# Patient Record
Sex: MALE | Race: WHITE | HISPANIC OR LATINO
[De-identification: names, ages, dates, MRNs, and addresses within clinical notes are randomized per-mention and may not be internally consistent; named-entity substitution may affect disease eponyms.]

---

## 2019-07-26 ENCOUNTER — APPOINTMENT (OUTPATIENT)
Dept: INTERNAL MEDICINE | Facility: CLINIC | Age: 74
End: 2019-07-26
Payer: MEDICARE

## 2019-07-26 ENCOUNTER — NON-APPOINTMENT (OUTPATIENT)
Age: 74
End: 2019-07-26

## 2019-07-26 VITALS
BODY MASS INDEX: 25.48 KG/M2 | TEMPERATURE: 98 F | HEART RATE: 91 BPM | DIASTOLIC BLOOD PRESSURE: 90 MMHG | OXYGEN SATURATION: 95 % | HEIGHT: 71 IN | SYSTOLIC BLOOD PRESSURE: 176 MMHG | WEIGHT: 182 LBS

## 2019-07-26 VITALS — DIASTOLIC BLOOD PRESSURE: 98 MMHG | SYSTOLIC BLOOD PRESSURE: 175 MMHG

## 2019-07-26 DIAGNOSIS — Z84.1 FAMILY HISTORY OF DISORDERS OF KIDNEY AND URETER: ICD-10-CM

## 2019-07-26 DIAGNOSIS — J30.2 OTHER SEASONAL ALLERGIC RHINITIS: ICD-10-CM

## 2019-07-26 DIAGNOSIS — Z78.9 OTHER SPECIFIED HEALTH STATUS: ICD-10-CM

## 2019-07-26 DIAGNOSIS — Z82.49 FAMILY HISTORY OF ISCHEMIC HEART DISEASE AND OTHER DISEASES OF THE CIRCULATORY SYSTEM: ICD-10-CM

## 2019-07-26 DIAGNOSIS — R94.31 ABNORMAL ELECTROCARDIOGRAM [ECG] [EKG]: ICD-10-CM

## 2019-07-26 DIAGNOSIS — Z87.81 PERSONAL HISTORY OF (HEALED) TRAUMATIC FRACTURE: ICD-10-CM

## 2019-07-26 DIAGNOSIS — Z87.891 PERSONAL HISTORY OF NICOTINE DEPENDENCE: ICD-10-CM

## 2019-07-26 PROCEDURE — G0444 DEPRESSION SCREEN ANNUAL: CPT

## 2019-07-26 PROCEDURE — 99213 OFFICE O/P EST LOW 20 MIN: CPT | Mod: 25

## 2019-07-26 PROCEDURE — 36415 COLL VENOUS BLD VENIPUNCTURE: CPT

## 2019-07-26 PROCEDURE — G0439: CPT

## 2019-07-26 PROCEDURE — 93000 ELECTROCARDIOGRAM COMPLETE: CPT

## 2019-07-26 NOTE — PLAN
[FreeTextEntry1] : Healthcare Maintenance\par -routine labs, follow up results\par -never had colonoscopy, discussed having one time screening vs stool FIT test but he declines both at this time\par -EKG reviewed\par -depression screen negative\par -to obtain prior records from [place] including EKG and CT chest and have sent to office\par \par HTN\par -above goal today, appears to have had HTN in past as well\par -states EKG was normal in ER last month, EKG in office today showing RBBB, cardiology referral provided\par -start amlodipine 5mg daily\par -return next week for follow up visit to monitor BP\par

## 2019-07-26 NOTE — HEALTH RISK ASSESSMENT
[No] : In the past 12 months have you used drugs other than those required for medical reasons? No [No falls in past year] : Patient reported no falls in the past year [0] : 2) Feeling down, depressed, or hopeless: Not at all (0) [None] : None [Alone] : lives alone [] :  [Retired] : retired [Feels Safe at Home] : Feels safe at home [# Of Children ___] : has [unfilled] children [Fully functional (bathing, dressing, toileting, transferring, walking, feeding)] : Fully functional (bathing, dressing, toileting, transferring, walking, feeding) [Fully functional (using the telephone, shopping, preparing meals, housekeeping, doing laundry, using] : Fully functional and needs no help or supervision to perform IADLs (using the telephone, shopping, preparing meals, housekeeping, doing laundry, using transportation, managing medications and managing finances) [Carbon Monoxide Detector] : carbon monoxide detector [Smoke Detector] : smoke detector [Seat Belt] :  uses seat belt [] : No [de-identified] : 1ppd for >40 years [CXQ1Oxnwr] : 0 [YearQuit] : 2019 [Patient declined colonoscopy] : Patient declined colonoscopy [Change in mental status noted] : No change in mental status noted [Language] : denies difficulty with language [Reports changes in hearing] : Reports no changes in hearing [Reports changes in vision] : Reports no changes in vision [Reports changes in dental health] : Reports no changes in dental health [ColonoscopyComments] : never had  [FreeTextEntry2] : semi-retired
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
## 2019-07-26 NOTE — PHYSICAL EXAM
[No Acute Distress] : no acute distress [Well Nourished] : well nourished [Well Developed] : well developed [Well-Appearing] : well-appearing [Normal Sclera/Conjunctiva] : normal sclera/conjunctiva [PERRL] : pupils equal round and reactive to light [Normal Outer Ear/Nose] : the outer ears and nose were normal in appearance [Normal Oropharynx] : the oropharynx was normal [Supple] : supple [No Respiratory Distress] : no respiratory distress  [No Accessory Muscle Use] : no accessory muscle use [Clear to Auscultation] : lungs were clear to auscultation bilaterally [Normal Rate] : normal rate  [Normal S1, S2] : normal S1 and S2 [Regular Rhythm] : with a regular rhythm [Pedal Pulses Present] : the pedal pulses are present [No Extremity Clubbing/Cyanosis] : no extremity clubbing/cyanosis [No Edema] : there was no peripheral edema [Soft] : abdomen soft [Non Tender] : non-tender [Non-distended] : non-distended [Normal Bowel Sounds] : normal bowel sounds [Normal Anterior Cervical Nodes] : no anterior cervical lymphadenopathy [No CVA Tenderness] : no CVA  tenderness [Grossly Normal Strength/Tone] : grossly normal strength/tone [Coordination Grossly Intact] : coordination grossly intact [No Focal Deficits] : no focal deficits [Normal Gait] : normal gait [Normal Affect] : the affect was normal [Alert and Oriented x3] : oriented to person, place, and time [Normal Insight/Judgement] : insight and judgment were intact [de-identified] : venous varicosities present bilateral ankles

## 2019-07-29 LAB
25(OH)D3 SERPL-MCNC: 37.2 NG/ML
ALBUMIN SERPL ELPH-MCNC: 4.3 G/DL
ALP BLD-CCNC: 119 U/L
ALT SERPL-CCNC: 21 U/L
ANION GAP SERPL CALC-SCNC: 12 MMOL/L
AST SERPL-CCNC: 19 U/L
BASOPHILS # BLD AUTO: 0.05 K/UL
BASOPHILS NFR BLD AUTO: 0.6 %
BILIRUB SERPL-MCNC: 0.3 MG/DL
BUN SERPL-MCNC: 20 MG/DL
CALCIUM SERPL-MCNC: 9 MG/DL
CHLORIDE SERPL-SCNC: 105 MMOL/L
CHOLEST SERPL-MCNC: 220 MG/DL
CHOLEST/HDLC SERPL: 4.3 RATIO
CO2 SERPL-SCNC: 22 MMOL/L
CREAT SERPL-MCNC: 1.07 MG/DL
EOSINOPHIL # BLD AUTO: 0.18 K/UL
EOSINOPHIL NFR BLD AUTO: 2.1 %
ESTIMATED AVERAGE GLUCOSE: 117 MG/DL
GLUCOSE SERPL-MCNC: 103 MG/DL
HBA1C MFR BLD HPLC: 5.7 %
HCT VFR BLD CALC: 47.5 %
HDLC SERPL-MCNC: 51 MG/DL
HGB BLD-MCNC: 15.8 G/DL
IMM GRANULOCYTES NFR BLD AUTO: 0.2 %
LDLC SERPL CALC-MCNC: 132 MG/DL
LYMPHOCYTES # BLD AUTO: 1.68 K/UL
LYMPHOCYTES NFR BLD AUTO: 19.7 %
MAN DIFF?: NORMAL
MCHC RBC-ENTMCNC: 31.9 PG
MCHC RBC-ENTMCNC: 33.3 GM/DL
MCV RBC AUTO: 95.8 FL
MONOCYTES # BLD AUTO: 0.7 K/UL
MONOCYTES NFR BLD AUTO: 8.2 %
NEUTROPHILS # BLD AUTO: 5.91 K/UL
NEUTROPHILS NFR BLD AUTO: 69.2 %
PLATELET # BLD AUTO: 275 K/UL
POTASSIUM SERPL-SCNC: 4.5 MMOL/L
PROT SERPL-MCNC: 6.8 G/DL
RBC # BLD: 4.96 M/UL
RBC # FLD: 13.3 %
SODIUM SERPL-SCNC: 139 MMOL/L
TRIGL SERPL-MCNC: 187 MG/DL
TSH SERPL-ACNC: 0.62 UIU/ML
WBC # FLD AUTO: 8.54 K/UL

## 2019-08-02 ENCOUNTER — APPOINTMENT (OUTPATIENT)
Dept: INTERNAL MEDICINE | Facility: CLINIC | Age: 74
End: 2019-08-02
Payer: MEDICARE

## 2019-08-02 VITALS
OXYGEN SATURATION: 97 % | BODY MASS INDEX: 25.2 KG/M2 | DIASTOLIC BLOOD PRESSURE: 98 MMHG | WEIGHT: 180 LBS | HEIGHT: 71 IN | SYSTOLIC BLOOD PRESSURE: 170 MMHG | HEART RATE: 84 BPM

## 2019-08-02 VITALS — SYSTOLIC BLOOD PRESSURE: 165 MMHG | DIASTOLIC BLOOD PRESSURE: 95 MMHG

## 2019-08-02 PROCEDURE — 99213 OFFICE O/P EST LOW 20 MIN: CPT

## 2019-08-02 RX ORDER — PHENYLEPHRINE HCL 10 MG
7 TABLET ORAL
Qty: 10 | Refills: 0 | Status: DISCONTINUED | COMMUNITY
Start: 2019-06-26 | End: 2019-08-02

## 2019-08-02 NOTE — PHYSICAL EXAM
[Well Nourished] : well nourished [No Acute Distress] : no acute distress [Well Developed] : well developed [Well-Appearing] : well-appearing [No Accessory Muscle Use] : no accessory muscle use [No Respiratory Distress] : no respiratory distress  [Normal Rate] : normal rate  [Clear to Auscultation] : lungs were clear to auscultation bilaterally [Regular Rhythm] : with a regular rhythm [Normal S1, S2] : normal S1 and S2 [No Focal Deficits] : no focal deficits [Alert and Oriented x3] : oriented to person, place, and time

## 2019-08-02 NOTE — PLAN
[FreeTextEntry1] : HTN, HLD\par -continue amlodipine 5mg, add HCTZ 25mg daily\par -has cardiology appointment with Dr Taylor scheduled for 8/12\par -continue simvastatin\par -follow up in 2-3 months for repeat labs

## 2019-08-02 NOTE — HISTORY OF PRESENT ILLNESS
[FreeTextEntry1] : follow up visit [de-identified] : Patient presents to follow up for his HTN; he was started on amlodipine last week. He was also started on simvastatin earlier this week for elevated cholesterol on blood work. He has been compliant with medications; deneis any acute complaints no HA, dizziness, CP, SOB.

## 2019-08-26 ENCOUNTER — MEDICATION RENEWAL (OUTPATIENT)
Age: 74
End: 2019-08-26

## 2019-09-03 ENCOUNTER — APPOINTMENT (OUTPATIENT)
Dept: INTERNAL MEDICINE | Facility: CLINIC | Age: 74
End: 2019-09-03
Payer: MEDICARE

## 2019-09-03 VITALS
WEIGHT: 189 LBS | BODY MASS INDEX: 26.46 KG/M2 | DIASTOLIC BLOOD PRESSURE: 62 MMHG | SYSTOLIC BLOOD PRESSURE: 120 MMHG | HEART RATE: 93 BPM | TEMPERATURE: 98.2 F | HEIGHT: 71 IN | OXYGEN SATURATION: 96 %

## 2019-09-03 PROCEDURE — 99213 OFFICE O/P EST LOW 20 MIN: CPT

## 2019-09-03 NOTE — PLAN
[FreeTextEntry1] : HTN\par -continue current medications- unsure of other two medications which were added but instructed him to call office later today to let me know\par -will consider decreasing dose of one medication given fatigue\par -follow up with cardiology later this month scheduled\par -follow up in 3 months here for routine visit or sooner as needed

## 2019-09-03 NOTE — PHYSICAL EXAM
[No Acute Distress] : no acute distress [Well Nourished] : well nourished [Well Developed] : well developed [No Respiratory Distress] : no respiratory distress  [No Accessory Muscle Use] : no accessory muscle use [Clear to Auscultation] : lungs were clear to auscultation bilaterally [Normal Rate] : normal rate  [Regular Rhythm] : with a regular rhythm [No Edema] : there was no peripheral edema [Normal S1, S2] : normal S1 and S2 [No Focal Deficits] : no focal deficits [Alert and Oriented x3] : oriented to person, place, and time [de-identified] : bilateral venous varicosities present

## 2019-09-03 NOTE — HISTORY OF PRESENT ILLNESS
[FreeTextEntry1] : follow up [de-identified] : Patient presents for routine follow up. He was seen by cardiologist Dr Taylor twice since last visit and started on two additional BP medications. He notes feeling more tired than usual over the past few weeks but otherwise feels well. He mentioned feeling tired to cardiology and told it is likely due to medications. He cannot recall the names of those meds that were added. \par

## 2019-09-23 ENCOUNTER — RX RENEWAL (OUTPATIENT)
Age: 74
End: 2019-09-23

## 2019-09-26 ENCOUNTER — MEDICATION RENEWAL (OUTPATIENT)
Age: 74
End: 2019-09-26

## 2019-10-21 ENCOUNTER — RX RENEWAL (OUTPATIENT)
Age: 74
End: 2019-10-21

## 2019-10-25 ENCOUNTER — MOBILE ON CALL (OUTPATIENT)
Age: 74
End: 2019-10-25

## 2019-12-03 ENCOUNTER — APPOINTMENT (OUTPATIENT)
Dept: INTERNAL MEDICINE | Facility: CLINIC | Age: 74
End: 2019-12-03
Payer: MEDICARE

## 2019-12-03 VITALS
BODY MASS INDEX: 26.32 KG/M2 | SYSTOLIC BLOOD PRESSURE: 110 MMHG | OXYGEN SATURATION: 96 % | HEIGHT: 71 IN | DIASTOLIC BLOOD PRESSURE: 80 MMHG | HEART RATE: 76 BPM | WEIGHT: 188 LBS | TEMPERATURE: 97.9 F

## 2019-12-03 LAB
ALBUMIN SERPL ELPH-MCNC: 4.4 G/DL
ALP BLD-CCNC: 60 U/L
ALT SERPL-CCNC: 20 U/L
ANION GAP SERPL CALC-SCNC: 14 MMOL/L
AST SERPL-CCNC: 18 U/L
BILIRUB SERPL-MCNC: 0.4 MG/DL
BUN SERPL-MCNC: 26 MG/DL
CALCIUM SERPL-MCNC: 9.4 MG/DL
CHLORIDE SERPL-SCNC: 104 MMOL/L
CHOLEST SERPL-MCNC: 203 MG/DL
CHOLEST/HDLC SERPL: 3.8 RATIO
CO2 SERPL-SCNC: 24 MMOL/L
CREAT SERPL-MCNC: 1.34 MG/DL
GLUCOSE SERPL-MCNC: 124 MG/DL
HDLC SERPL-MCNC: 54 MG/DL
LDLC SERPL CALC-MCNC: 107 MG/DL
POTASSIUM SERPL-SCNC: 4.2 MMOL/L
PROT SERPL-MCNC: 7 G/DL
SODIUM SERPL-SCNC: 142 MMOL/L
TRIGL SERPL-MCNC: 208 MG/DL

## 2019-12-03 PROCEDURE — 99214 OFFICE O/P EST MOD 30 MIN: CPT | Mod: 25

## 2019-12-03 PROCEDURE — 36415 COLL VENOUS BLD VENIPUNCTURE: CPT

## 2019-12-03 RX ORDER — METOPROLOL SUCCINATE 25 MG/1
25 TABLET, EXTENDED RELEASE ORAL
Refills: 0 | Status: ACTIVE | COMMUNITY

## 2019-12-03 RX ORDER — HYDROCHLOROTHIAZIDE 25 MG/1
25 TABLET ORAL DAILY
Qty: 90 | Refills: 0 | Status: DISCONTINUED | COMMUNITY
Start: 2019-08-02 | End: 2019-12-03

## 2019-12-03 NOTE — PHYSICAL EXAM
[No Acute Distress] : no acute distress [Well Developed] : well developed [Well Nourished] : well nourished [No Respiratory Distress] : no respiratory distress  [Clear to Auscultation] : lungs were clear to auscultation bilaterally [No Accessory Muscle Use] : no accessory muscle use [Normal Rate] : normal rate  [Regular Rhythm] : with a regular rhythm [No Focal Deficits] : no focal deficits [No Edema] : there was no peripheral edema [Normal S1, S2] : normal S1 and S2 [Alert and Oriented x3] : oriented to person, place, and time

## 2019-12-03 NOTE — PLAN
[FreeTextEntry1] : HTN\par -well controlled\par -continue current regimen\par \par HLD\par -continue simvastatin\par -check lipids and CMP today\par \par Declines flu shot\par Routine follow up in 6 months or sooner as needed

## 2019-12-03 NOTE — HISTORY OF PRESENT ILLNESS
[FreeTextEntry1] : BP follow up [de-identified] : Patient presents for routine follow up, he was last seen by cardiologist Dr Taylor 10/31 and BP was 110/85. Denies any HA, dizziness, CP, palp, SOB. Cardiologist had cut down doses of two BP medications and since then notes improvement in generalized fatigue that he was experiencing prior. No other acute complaints.

## 2019-12-03 NOTE — ADDENDUM
[FreeTextEntry1] : 2:40pm\par Results reviewed with patient\par Cr 1.34- encourage hydration, return in 1 month for repeat labs, if remains elevated and BP controlled, will consider medication change

## 2020-01-06 ENCOUNTER — APPOINTMENT (OUTPATIENT)
Dept: INTERNAL MEDICINE | Facility: CLINIC | Age: 75
End: 2020-01-06
Payer: MEDICARE

## 2020-01-06 VITALS
SYSTOLIC BLOOD PRESSURE: 137 MMHG | WEIGHT: 188 LBS | HEART RATE: 75 BPM | DIASTOLIC BLOOD PRESSURE: 80 MMHG | OXYGEN SATURATION: 99 % | BODY MASS INDEX: 26.32 KG/M2 | HEIGHT: 71 IN

## 2020-01-06 PROCEDURE — 99213 OFFICE O/P EST LOW 20 MIN: CPT | Mod: 25

## 2020-01-06 PROCEDURE — 36415 COLL VENOUS BLD VENIPUNCTURE: CPT

## 2020-01-06 NOTE — PLAN
[FreeTextEntry1] : HTN\par -controlled, continue current medications\par -repeat BMP today to monitor Cr- if increased will consider medication change\par -routine follow up

## 2020-01-06 NOTE — HISTORY OF PRESENT ILLNESS
[FreeTextEntry1] : follow up [de-identified] : Patient presents for follow up. At last visit his Cr was noted to be slightly elevated, encouraged hydration and will repeat labs today. Notably he only drinks one can of club soda per day, otherwise has coffee in the morning and a glass of juice in the evening. He has no acute complaints.

## 2020-01-06 NOTE — PHYSICAL EXAM
[Well Nourished] : well nourished [No Acute Distress] : no acute distress [Well Developed] : well developed [No Respiratory Distress] : no respiratory distress  [No Accessory Muscle Use] : no accessory muscle use [Clear to Auscultation] : lungs were clear to auscultation bilaterally [Normal Rate] : normal rate  [Regular Rhythm] : with a regular rhythm [No Edema] : there was no peripheral edema [Normal S1, S2] : normal S1 and S2 [Alert and Oriented x3] : oriented to person, place, and time [No Focal Deficits] : no focal deficits

## 2020-01-07 LAB
ANION GAP SERPL CALC-SCNC: 15 MMOL/L
BUN SERPL-MCNC: 29 MG/DL
CALCIUM SERPL-MCNC: 9.2 MG/DL
CHLORIDE SERPL-SCNC: 103 MMOL/L
CO2 SERPL-SCNC: 22 MMOL/L
CREAT SERPL-MCNC: 1.2 MG/DL
GLUCOSE SERPL-MCNC: 83 MG/DL
POTASSIUM SERPL-SCNC: 4.4 MMOL/L
SODIUM SERPL-SCNC: 140 MMOL/L

## 2020-01-21 ENCOUNTER — RX RENEWAL (OUTPATIENT)
Age: 75
End: 2020-01-21

## 2020-02-18 ENCOUNTER — RX RENEWAL (OUTPATIENT)
Age: 75
End: 2020-02-18

## 2020-04-13 ENCOUNTER — RX RENEWAL (OUTPATIENT)
Age: 75
End: 2020-04-13

## 2020-06-03 ENCOUNTER — APPOINTMENT (OUTPATIENT)
Dept: INTERNAL MEDICINE | Facility: CLINIC | Age: 75
End: 2020-06-03
Payer: MEDICARE

## 2020-06-03 VITALS
BODY MASS INDEX: 27.72 KG/M2 | SYSTOLIC BLOOD PRESSURE: 151 MMHG | WEIGHT: 198 LBS | TEMPERATURE: 98.9 F | OXYGEN SATURATION: 96 % | HEIGHT: 71 IN | DIASTOLIC BLOOD PRESSURE: 82 MMHG | HEART RATE: 80 BPM

## 2020-06-03 DIAGNOSIS — Z23 ENCOUNTER FOR IMMUNIZATION: ICD-10-CM

## 2020-06-03 PROCEDURE — 99213 OFFICE O/P EST LOW 20 MIN: CPT | Mod: 25

## 2020-06-03 PROCEDURE — 36415 COLL VENOUS BLD VENIPUNCTURE: CPT

## 2020-06-03 NOTE — PLAN
[FreeTextEntry1] : HTN\par -borderline today, has been well controlled will hold off adjusting medications\par -check BMP to monitor renal function\par -last seen by cardiologist Feb/March 2020- advised he can follow up as needed\par \par Follow up in 2-3 months for CPE\par Declines COVID Ab test

## 2020-06-03 NOTE — PHYSICAL EXAM
[Well Nourished] : well nourished [No Acute Distress] : no acute distress [Well Developed] : well developed [No Respiratory Distress] : no respiratory distress  [No Accessory Muscle Use] : no accessory muscle use [Clear to Auscultation] : lungs were clear to auscultation bilaterally [Normal Rate] : normal rate  [Regular Rhythm] : with a regular rhythm [Normal S1, S2] : normal S1 and S2 [Alert and Oriented x3] : oriented to person, place, and time [No Focal Deficits] : no focal deficits

## 2020-06-03 NOTE — HISTORY OF PRESENT ILLNESS
[de-identified] : No acute complaints today. He has been doing well during the COVID pandemic, has not been sick and denies fever, chills, CP, SOB, cough. [FreeTextEntry1] : follow up

## 2020-06-04 LAB
ANION GAP SERPL CALC-SCNC: 14 MMOL/L
BUN SERPL-MCNC: 21 MG/DL
CALCIUM SERPL-MCNC: 9.1 MG/DL
CHLORIDE SERPL-SCNC: 101 MMOL/L
CO2 SERPL-SCNC: 24 MMOL/L
CREAT SERPL-MCNC: 1.22 MG/DL
GLUCOSE SERPL-MCNC: 121 MG/DL
POTASSIUM SERPL-SCNC: 4.4 MMOL/L
SODIUM SERPL-SCNC: 139 MMOL/L

## 2020-06-25 ENCOUNTER — RX RENEWAL (OUTPATIENT)
Age: 75
End: 2020-06-25

## 2020-09-03 ENCOUNTER — APPOINTMENT (OUTPATIENT)
Dept: INTERNAL MEDICINE | Facility: CLINIC | Age: 75
End: 2020-09-03
Payer: MEDICARE

## 2020-09-03 VITALS
WEIGHT: 197 LBS | HEART RATE: 72 BPM | DIASTOLIC BLOOD PRESSURE: 79 MMHG | OXYGEN SATURATION: 97 % | SYSTOLIC BLOOD PRESSURE: 144 MMHG | BODY MASS INDEX: 27.58 KG/M2 | TEMPERATURE: 98 F | HEIGHT: 71 IN

## 2020-09-03 DIAGNOSIS — Z00.00 ENCOUNTER FOR GENERAL ADULT MEDICAL EXAMINATION W/OUT ABNORMAL FINDINGS: ICD-10-CM

## 2020-09-03 DIAGNOSIS — Z13.31 ENCOUNTER FOR SCREENING FOR DEPRESSION: ICD-10-CM

## 2020-09-03 DIAGNOSIS — E78.5 HYPERLIPIDEMIA, UNSPECIFIED: ICD-10-CM

## 2020-09-03 PROCEDURE — G0439: CPT

## 2020-09-03 NOTE — PLAN
[FreeTextEntry1] : HCM\par -script for routine labs provided to be done fasting\par -declines flu shot, declines pneumonia shot\par -discussed Tdap, declines vaccination\par -never had colonoscopy- declines screening test and declines FOBT option\par \par HTN, HLD\par -continue current medication regimen\par -to check fasting lipid panel- will adjust statin if necessary

## 2020-09-03 NOTE — PHYSICAL EXAM
[No Acute Distress] : no acute distress [Well Nourished] : well nourished [Well-Appearing] : well-appearing [Well Developed] : well developed [Normal Sclera/Conjunctiva] : normal sclera/conjunctiva [PERRL] : pupils equal round and reactive to light [No Respiratory Distress] : no respiratory distress  [Normal Outer Ear/Nose] : the outer ears and nose were normal in appearance [No Accessory Muscle Use] : no accessory muscle use [Clear to Auscultation] : lungs were clear to auscultation bilaterally [Normal Rate] : normal rate  [Regular Rhythm] : with a regular rhythm [Normal S1, S2] : normal S1 and S2 [No Edema] : there was no peripheral edema [Soft] : abdomen soft [Non Tender] : non-tender [Normal Bowel Sounds] : normal bowel sounds [Non-distended] : non-distended [Grossly Normal Strength/Tone] : grossly normal strength/tone [No CVA Tenderness] : no CVA  tenderness [Coordination Grossly Intact] : coordination grossly intact [No Focal Deficits] : no focal deficits [Normal Gait] : normal gait [Normal Affect] : the affect was normal [Alert and Oriented x3] : oriented to person, place, and time [Normal Insight/Judgement] : insight and judgment were intact

## 2020-09-03 NOTE — HISTORY OF PRESENT ILLNESS
[FreeTextEntry1] : CPE [de-identified] : Patient presents for CPE.\par He has no acute complaints today.\par He has a cataract in his right eye, to see ophthalmologist and will likely plan for surgery at some point.\par He last saw his cardiologist earlier this year in February/March, advised to follow up as needed.\par

## 2020-09-03 NOTE — HEALTH RISK ASSESSMENT
[No] : In the past 12 months have you used drugs other than those required for medical reasons? No [Patient declined colonoscopy] : Patient declined colonoscopy [None] : None [Alone] : lives alone [Retired] : retired [# Of Children ___] : has [unfilled] children [] :  [Fully functional (bathing, dressing, toileting, transferring, walking, feeding)] : Fully functional (bathing, dressing, toileting, transferring, walking, feeding) [Feels Safe at Home] : Feels safe at home [Fully functional (using the telephone, shopping, preparing meals, housekeeping, doing laundry, using] : Fully functional and needs no help or supervision to perform IADLs (using the telephone, shopping, preparing meals, housekeeping, doing laundry, using transportation, managing medications and managing finances) [] : No [YearQuit] : 2019 [Change in mental status noted] : No change in mental status noted [Language] : denies difficulty with language [Reports changes in hearing] : Reports no changes in hearing [Reports changes in dental health] : Reports no changes in dental health [Reports changes in vision] : Reports no changes in vision

## 2020-09-09 LAB
25(OH)D3 SERPL-MCNC: 47.3 NG/ML
ALBUMIN SERPL ELPH-MCNC: 4.2 G/DL
ALP BLD-CCNC: 55 U/L
ALT SERPL-CCNC: 20 U/L
ANION GAP SERPL CALC-SCNC: 16 MMOL/L
AST SERPL-CCNC: 19 U/L
BASOPHILS # BLD AUTO: 0.07 K/UL
BASOPHILS NFR BLD AUTO: 0.8 %
BILIRUB SERPL-MCNC: 0.4 MG/DL
BUN SERPL-MCNC: 23 MG/DL
CALCIUM SERPL-MCNC: 9 MG/DL
CHLORIDE SERPL-SCNC: 102 MMOL/L
CHOLEST SERPL-MCNC: 190 MG/DL
CHOLEST/HDLC SERPL: 3.8 RATIO
CO2 SERPL-SCNC: 24 MMOL/L
CREAT SERPL-MCNC: 1.37 MG/DL
EOSINOPHIL # BLD AUTO: 0.39 K/UL
EOSINOPHIL NFR BLD AUTO: 4.4 %
ESTIMATED AVERAGE GLUCOSE: 123 MG/DL
GLUCOSE SERPL-MCNC: 99 MG/DL
HBA1C MFR BLD HPLC: 5.9 %
HCT VFR BLD CALC: 45.5 %
HDLC SERPL-MCNC: 50 MG/DL
HGB BLD-MCNC: 14.8 G/DL
IMM GRANULOCYTES NFR BLD AUTO: 0.2 %
LDLC SERPL CALC-MCNC: 111 MG/DL
LYMPHOCYTES # BLD AUTO: 2.14 K/UL
LYMPHOCYTES NFR BLD AUTO: 24.4 %
MAN DIFF?: NORMAL
MCHC RBC-ENTMCNC: 31.2 PG
MCHC RBC-ENTMCNC: 32.5 GM/DL
MCV RBC AUTO: 95.8 FL
MONOCYTES # BLD AUTO: 0.7 K/UL
MONOCYTES NFR BLD AUTO: 8 %
NEUTROPHILS # BLD AUTO: 5.45 K/UL
NEUTROPHILS NFR BLD AUTO: 62.2 %
PLATELET # BLD AUTO: 288 K/UL
POTASSIUM SERPL-SCNC: 4.4 MMOL/L
PROT SERPL-MCNC: 6.7 G/DL
RBC # BLD: 4.75 M/UL
RBC # FLD: 13.3 %
SODIUM SERPL-SCNC: 142 MMOL/L
TRIGL SERPL-MCNC: 144 MG/DL
TSH SERPL-ACNC: 0.93 UIU/ML
WBC # FLD AUTO: 8.77 K/UL

## 2020-10-12 ENCOUNTER — APPOINTMENT (OUTPATIENT)
Dept: INTERNAL MEDICINE | Facility: CLINIC | Age: 75
End: 2020-10-12
Payer: MEDICARE

## 2020-10-12 VITALS
SYSTOLIC BLOOD PRESSURE: 155 MMHG | TEMPERATURE: 98.5 F | WEIGHT: 201 LBS | HEIGHT: 71 IN | HEART RATE: 83 BPM | BODY MASS INDEX: 28.14 KG/M2 | DIASTOLIC BLOOD PRESSURE: 88 MMHG | OXYGEN SATURATION: 97 %

## 2020-10-12 VITALS — SYSTOLIC BLOOD PRESSURE: 140 MMHG | DIASTOLIC BLOOD PRESSURE: 80 MMHG

## 2020-10-12 DIAGNOSIS — H33.21 SEROUS RETINAL DETACHMENT, RIGHT EYE: ICD-10-CM

## 2020-10-12 PROCEDURE — 99214 OFFICE O/P EST MOD 30 MIN: CPT

## 2020-10-12 RX ORDER — ALBUTEROL SULFATE 90 UG/1
108 (90 BASE) INHALANT RESPIRATORY (INHALATION)
Qty: 9 | Refills: 0 | Status: DISCONTINUED | COMMUNITY
Start: 2019-06-26 | End: 2020-10-12

## 2020-10-12 NOTE — PHYSICAL EXAM
[No Acute Distress] : no acute distress [Well Nourished] : well nourished [Well Developed] : well developed [No Respiratory Distress] : no respiratory distress  [No Accessory Muscle Use] : no accessory muscle use [Clear to Auscultation] : lungs were clear to auscultation bilaterally [Normal Rate] : normal rate  [Regular Rhythm] : with a regular rhythm [Normal S1, S2] : normal S1 and S2 [No Edema] : there was no peripheral edema [Coordination Grossly Intact] : coordination grossly intact [No Focal Deficits] : no focal deficits [Normal Affect] : the affect was normal [Alert and Oriented x3] : oriented to person, place, and time [Normal Insight/Judgement] : insight and judgment were intact

## 2020-10-12 NOTE — HISTORY OF PRESENT ILLNESS
[No Pertinent Pulmonary History] : no history of asthma, COPD, sleep apnea, or smoking [No Adverse Anesthesia Reaction] : no adverse anesthesia reaction in self or family member [(Patient denies any chest pain, claudication, dyspnea on exertion, orthopnea, palpitations or syncope)] : Patient denies any chest pain, claudication, dyspnea on exertion, orthopnea, palpitations or syncope [No Pertinent Cardiac History] : no history of aortic stenosis, atrial fibrillation, coronary artery disease, recent myocardial infarction, or implantable device/pacemaker [Asthma] : no asthma [COPD] : no COPD [Sleep Apnea] : no sleep apnea [Smoker] : not a smoker [Chronic Anticoagulation] : no chronic anticoagulation [Chronic Kidney Disease] : chronic kidney disease [Diabetes] : no diabetes [FreeTextEntry1] : right vitrectomy [FreeTextEntry2] : 10/16/20 [FreeTextEntry3] : Dr Lai [FreeTextEntry4] : Patient presents for preoperative exam. \par He has no acute complaints today. Denies any HA, CP, palp, SOB, abd pain.\par He went for PST, had labs and EKG done. \par COVID test to be done 72 hours prior to procedure.

## 2020-10-12 NOTE — PLAN
[FreeTextEntry1] : Preoperative exam prior to scheduled right vitrectomy 10/16\par -EKG reviewed from [facility], most recent EKG done at cardiologist Dr Taylor's office 1/20 obtained and largely unchanged from this\par -labs stable- mild CKD stable\par -HTN stable \par -medically optimized for scheduled procedure\par -hold ibuprofen/NSAIDs until after procedure

## 2020-10-28 ENCOUNTER — RX RENEWAL (OUTPATIENT)
Age: 75
End: 2020-10-28

## 2021-03-18 ENCOUNTER — RX RENEWAL (OUTPATIENT)
Age: 76
End: 2021-03-18

## 2021-03-23 ENCOUNTER — APPOINTMENT (OUTPATIENT)
Dept: INTERNAL MEDICINE | Facility: CLINIC | Age: 76
End: 2021-03-23
Payer: MEDICARE

## 2021-03-23 VITALS
HEIGHT: 71 IN | TEMPERATURE: 98.2 F | SYSTOLIC BLOOD PRESSURE: 146 MMHG | BODY MASS INDEX: 27.16 KG/M2 | DIASTOLIC BLOOD PRESSURE: 88 MMHG | WEIGHT: 194 LBS | HEART RATE: 69 BPM | OXYGEN SATURATION: 98 %

## 2021-03-23 VITALS — SYSTOLIC BLOOD PRESSURE: 140 MMHG | DIASTOLIC BLOOD PRESSURE: 80 MMHG

## 2021-03-23 DIAGNOSIS — H57.89 OTHER SPECIFIED DISORDERS OF EYE AND ADNEXA: ICD-10-CM

## 2021-03-23 DIAGNOSIS — I10 ESSENTIAL (PRIMARY) HYPERTENSION: ICD-10-CM

## 2021-03-23 DIAGNOSIS — Z01.818 ENCOUNTER FOR OTHER PREPROCEDURAL EXAMINATION: ICD-10-CM

## 2021-03-23 PROCEDURE — 99072 ADDL SUPL MATRL&STAF TM PHE: CPT

## 2021-03-23 PROCEDURE — 99214 OFFICE O/P EST MOD 30 MIN: CPT

## 2021-03-23 NOTE — HISTORY OF PRESENT ILLNESS
[No Pertinent Cardiac History] : no history of aortic stenosis, atrial fibrillation, coronary artery disease, recent myocardial infarction, or implantable device/pacemaker [No Pertinent Pulmonary History] : no history of asthma, COPD, sleep apnea, or smoking [No Adverse Anesthesia Reaction] : no adverse anesthesia reaction in self or family member [Chronic Kidney Disease] : chronic kidney disease [(Patient denies any chest pain, claudication, dyspnea on exertion, orthopnea, palpitations or syncope)] : Patient denies any chest pain, claudication, dyspnea on exertion, orthopnea, palpitations or syncope [Chronic Anticoagulation] : no chronic anticoagulation [Diabetes] : no diabetes [FreeTextEntry1] : right eye silicone oil removal [FreeTextEntry2] : 4/9/21 [FreeTextEntry3] : Dr Lai [FreeTextEntry4] : Patient presents for preoperative exam, scheduled for right eye silicone oil removal. He had eye procedure on the same side done in October 2020. During that procedure silicone was placed in his eye and now getting this removed. He does have some right eye irritation.\par He has PST scheduled for this coming Friday 3/26/21.\par COVID test will be scheduled within 72 hours of procedure. He got his first dose of COVID vaccine 3/21/21.

## 2021-03-23 NOTE — PLAN
[FreeTextEntry1] : Preoperative exam\par -surgery scheduled for 4/9 with Dr Lai\par -PST scheduled for 2/26 at [facility]- reports will be sent after\par -COVID test within 72 hours\par -HTN manual repeat stable\par -medically optimized pending review of PST

## 2021-05-17 ENCOUNTER — RX RENEWAL (OUTPATIENT)
Age: 76
End: 2021-05-17

## 2021-05-17 RX ORDER — HYDROCHLOROTHIAZIDE 25 MG/1
25 TABLET ORAL
Qty: 90 | Refills: 1 | Status: ACTIVE | COMMUNITY
Start: 2019-10-25 | End: 1900-01-01

## 2021-07-18 ENCOUNTER — RX RENEWAL (OUTPATIENT)
Age: 76
End: 2021-07-18

## 2021-08-17 RX ORDER — LOSARTAN POTASSIUM 25 MG/1
25 TABLET, FILM COATED ORAL
Qty: 90 | Refills: 3 | Status: ACTIVE | COMMUNITY
Start: 1900-01-01 | End: 1900-01-01

## 2021-08-19 ENCOUNTER — RX CHANGE (OUTPATIENT)
Age: 76
End: 2021-08-19

## 2021-08-19 RX ORDER — SIMVASTATIN 5 MG/1
5 TABLET, FILM COATED ORAL
Qty: 90 | Refills: 3 | Status: ACTIVE | COMMUNITY
Start: 2021-08-19 | End: 1900-01-01

## 2021-08-19 RX ORDER — SIMVASTATIN 5 MG/1
5 TABLET, FILM COATED ORAL
Qty: 30 | Refills: 3 | Status: DISCONTINUED | COMMUNITY
Start: 2019-07-29 | End: 2021-08-19

## 2021-10-22 ENCOUNTER — RX RENEWAL (OUTPATIENT)
Age: 76
End: 2021-10-22

## 2021-10-22 RX ORDER — AMLODIPINE BESYLATE 5 MG/1
5 TABLET ORAL
Qty: 90 | Refills: 0 | Status: ACTIVE | COMMUNITY
Start: 2019-07-26 | End: 1900-01-01

## 2024-01-25 ENCOUNTER — APPOINTMENT (OUTPATIENT)
Dept: INTERNAL MEDICINE | Facility: CLINIC | Age: 79
End: 2024-01-25